# Patient Record
Sex: FEMALE | Race: WHITE | NOT HISPANIC OR LATINO | Employment: UNEMPLOYED | URBAN - METROPOLITAN AREA
[De-identification: names, ages, dates, MRNs, and addresses within clinical notes are randomized per-mention and may not be internally consistent; named-entity substitution may affect disease eponyms.]

---

## 2018-05-08 ENCOUNTER — HISTORICAL (OUTPATIENT)
Dept: ADMINISTRATIVE | Facility: HOSPITAL | Age: 43
End: 2018-05-08

## 2019-12-18 ENCOUNTER — HISTORICAL (OUTPATIENT)
Dept: ADMINISTRATIVE | Facility: HOSPITAL | Age: 44
End: 2019-12-18

## 2024-04-03 ENCOUNTER — OFFICE VISIT (OUTPATIENT)
Dept: OBSTETRICS AND GYNECOLOGY | Facility: CLINIC | Age: 49
End: 2024-04-03
Payer: MEDICAID

## 2024-04-03 VITALS
RESPIRATION RATE: 17 BRPM | SYSTOLIC BLOOD PRESSURE: 128 MMHG | BODY MASS INDEX: 30.76 KG/M2 | HEART RATE: 101 BPM | HEIGHT: 67 IN | DIASTOLIC BLOOD PRESSURE: 74 MMHG | WEIGHT: 196 LBS | OXYGEN SATURATION: 98 %

## 2024-04-03 DIAGNOSIS — Z01.419 ROUTINE GYNECOLOGICAL EXAMINATION: Primary | ICD-10-CM

## 2024-04-03 DIAGNOSIS — Z11.3 ENCOUNTER FOR SCREENING FOR INFECTIONS WITH PREDOMINANTLY SEXUAL MODE OF TRANSMISSION: ICD-10-CM

## 2024-04-03 DIAGNOSIS — R68.82 DECREASED LIBIDO: ICD-10-CM

## 2024-04-03 DIAGNOSIS — R53.83 OTHER FATIGUE: ICD-10-CM

## 2024-04-03 DIAGNOSIS — Z12.31 SCREENING MAMMOGRAM FOR BREAST CANCER: ICD-10-CM

## 2024-04-03 DIAGNOSIS — N92.6 IRREGULAR MENSTRUAL CYCLE: ICD-10-CM

## 2024-04-03 LAB
ERYTHROCYTE [DISTWIDTH] IN BLOOD BY AUTOMATED COUNT: 14.1 % (ref 11–14.5)
HCT VFR BLD AUTO: 31 % (ref 36–48)
HGB BLD-MCNC: 10.2 G/DL (ref 11.8–16)
MCH RBC QN AUTO: 26.5 PG (ref 27–34)
MCHC RBC AUTO-ENTMCNC: 32.9 G/DL (ref 31–37)
MCV RBC AUTO: 80.5 FL (ref 79–99)
NRBC BLD AUTO-RTO: 0 %
PLATELET # BLD AUTO: 524 X10(3)/MCL (ref 140–371)
PMV BLD AUTO: 8.4 FL (ref 9.4–12.4)
RBC # BLD AUTO: 3.85 X10(6)/MCL (ref 4–5.1)
WBC # SPEC AUTO: 8.18 X10(3)/MCL (ref 4–11.5)

## 2024-04-03 PROCEDURE — 3078F DIAST BP <80 MM HG: CPT | Mod: CPTII,,, | Performed by: NURSE PRACTITIONER

## 2024-04-03 PROCEDURE — 85027 COMPLETE CBC AUTOMATED: CPT | Performed by: NURSE PRACTITIONER

## 2024-04-03 PROCEDURE — 82166 ASSAY ANTI-MULLERIAN HORM: CPT | Performed by: NURSE PRACTITIONER

## 2024-04-03 PROCEDURE — 3008F BODY MASS INDEX DOCD: CPT | Mod: CPTII,,, | Performed by: NURSE PRACTITIONER

## 2024-04-03 PROCEDURE — 3074F SYST BP LT 130 MM HG: CPT | Mod: CPTII,,, | Performed by: NURSE PRACTITIONER

## 2024-04-03 PROCEDURE — 87591 N.GONORRHOEAE DNA AMP PROB: CPT | Performed by: NURSE PRACTITIONER

## 2024-04-03 PROCEDURE — 82670 ASSAY OF TOTAL ESTRADIOL: CPT | Performed by: NURSE PRACTITIONER

## 2024-04-03 PROCEDURE — 99386 PREV VISIT NEW AGE 40-64: CPT | Mod: ,,, | Performed by: NURSE PRACTITIONER

## 2024-04-03 PROCEDURE — 4010F ACE/ARB THERAPY RXD/TAKEN: CPT | Mod: CPTII,,, | Performed by: NURSE PRACTITIONER

## 2024-04-03 PROCEDURE — 83001 ASSAY OF GONADOTROPIN (FSH): CPT | Performed by: NURSE PRACTITIONER

## 2024-04-03 PROCEDURE — 87661 TRICHOMONAS VAGINALIS AMPLIF: CPT | Performed by: NURSE PRACTITIONER

## 2024-04-03 PROCEDURE — 1160F RVW MEDS BY RX/DR IN RCRD: CPT | Mod: CPTII,,, | Performed by: NURSE PRACTITIONER

## 2024-04-03 PROCEDURE — 1159F MED LIST DOCD IN RCRD: CPT | Mod: CPTII,,, | Performed by: NURSE PRACTITIONER

## 2024-04-03 PROCEDURE — 87491 CHLMYD TRACH DNA AMP PROBE: CPT | Performed by: NURSE PRACTITIONER

## 2024-04-03 RX ORDER — ALPRAZOLAM 0.25 MG/1
0.25 TABLET ORAL 2 TIMES DAILY PRN
COMMUNITY
Start: 2024-01-03

## 2024-04-03 RX ORDER — CLOBETASOL PROPIONATE 0.5 MG/G
1 CREAM TOPICAL 2 TIMES DAILY
COMMUNITY
Start: 2024-02-16

## 2024-04-03 RX ORDER — LEVOTHYROXINE, LIOTHYRONINE 38; 9 UG/1; UG/1
60 TABLET ORAL
COMMUNITY
Start: 2024-03-14

## 2024-04-03 RX ORDER — ONDANSETRON 8 MG/1
8 TABLET, ORALLY DISINTEGRATING ORAL EVERY 6 HOURS PRN
COMMUNITY
Start: 2023-12-16

## 2024-04-03 RX ORDER — LISINOPRIL 10 MG/1
10 TABLET ORAL DAILY
COMMUNITY
Start: 2024-04-01

## 2024-04-03 NOTE — PROGRESS NOTES
Patient ID: [id number]   Chief Complaint: Annual exam  Chief Complaint   Patient presents with    Well Woman     C/o irregular cycles, fatigue, insomnia. Would like hormones checked. Low sex drive .      HPI:   Flaquita Bustamante is a 49 y.o. year old  here for her Annual Exam. Patient's last menstrual period was 2024 (approximate). She is doing well. Denies any health changes.   Well Woman (C/o irregular cycles, fatigue, insomnia. Would like hormones checked. Low sex drive . )    Subjective:     Past Medical History:   Diagnosis Date    Anxiety disorder, unspecified     HTN (hypertension)     Hypothyroidism, unspecified      Past Surgical History:   Procedure Laterality Date    BILATERAL TUBAL LIGATION       SECTION      DILATION AND CURETTAGE OF UTERUS      hand sx       Social History     Tobacco Use    Smoking status: Every Day     Current packs/day: 1.00     Average packs/day: 1 pack/day for 24.3 years (24.3 ttl pk-yrs)     Types: Cigarettes     Start date:      Passive exposure: Current    Smokeless tobacco: Never   Substance Use Topics    Alcohol use: Yes     Comment: social    Drug use: Never     Family History   Problem Relation Age of Onset    Hypertension Father     Breast cancer Mother     Hypertension Mother     Diabetes Mother     Cancer Mother     Hyperlipidemia Mother     Breast cancer Other      OB History    Para Term  AB Living   7 5 5   2 4   SAB IAB Ectopic Multiple Live Births   2       5      # Outcome Date GA Lbr Vinh/2nd Weight Sex Delivery Anes PTL Lv   7 Term 07/10/09 37w0d   M CS-Unspec   DEC   6 Term 07 38w0d   F Vag-Spont EPI  [facility]   5 Term 05 38w0d   F Vag-Spont EPI  [facility]   4 2003 16w0d       FD   3 Term 02 38w0d   M Vag-Spont EPI  [facility]   2 2000 11w0d       FD   1 Term 98 38w0d   M Vag-Spont EPI  [facility]       Current Outpatient Medications:     ALPRAZolam (XANAX) 0.25 MG tablet, Take 0.25 mg by mouth 2 (two) times daily  "as needed for Anxiety., Disp: , Rfl:     clobetasoL (TEMOVATE) 0.05 % cream, Apply 1 application  topically 2 (two) times daily., Disp: , Rfl:     lisinopriL 10 MG tablet, Take 10 mg by mouth once daily., Disp: , Rfl:     NP THYROID 60 mg Tab, Take 60 mg by mouth before breakfast., Disp: , Rfl:     ondansetron (ZOFRAN-ODT) 8 MG TbDL, Take 8 mg by mouth every 6 (six) hours as needed., Disp: , Rfl:   MENARCHEAL:  Cycle Length: 7 days   Flow: heavy  Dysmenorrhea: Yes  If yes: [place]eraye  Intermenstrual Bleeding: No  PAP:  Last PAP: states" over 5 yrs ago"   History of Abnormal PAP Smear: NO  Treated: n/a  HPV Vaccine: NO  INTERCOURSE:  Dyspareunia: No  Postcoital Bleeding: No  History of STI: No   If yes, then: No   Current Birth Control Method: tubal ligation  Sexually Active: yes  BREAST HISTORY:   Last Mammogram: 3 yrs ago  History of Abnormal Mammogram: NO        Review of Systems 12 point review of systems conducted, negative except as stated in the history of present illness.   See HPI for details.  Objective:   Visit Vitals  /74 (BP Location: Left arm)   Pulse 101   Resp 17   Ht 5' 7" (1.702 m)   Wt 88.9 kg (196 lb)   LMP 03/18/2024 (Approximate)   SpO2 98%   BMI 30.70 kg/m²     No results found for this or any previous visit (from the past 24 hour(s)).  Physical Exam:  Chaperone present for exam.  Physical Exam  Constitutional:  General Appearance : alert, in no acute distress, normal, well nourished.  Cardiovascular:   Regular rate and rhythm.  Respiratory:  Respiratory Effort: normal.  Breast:  Right: Inspection/palpation: no discharge, no masses present, no nipple retraction, no skin changes, no skin dimpling, no tenderness, no lymphadenopathy, no axillary mass, no axillary tenderness.  Left: Inspection/palpation: no discharge, no masses present, no nipple retraction, no skin changes, no skin dimpling, no tenderness, no lymphadenopathy, no axillary mass, no axillary " tenderness.  Gastrointestinal:  Abdomen: no masses. no tender, nondistended.  Hernias: no hernias present.  Genitourinary:  External Genitalia: normal, no lesions.  Vagina: normal appearance, no abnormal discharge, no lesions.  Bladder: no mass, nontender.  Urethra: no erythema or lesions present.  Cervix: no lesions, non tender. Pap Done  Uterus: nontender, normal contour, normal mobility, normal size.   Adnexa: no masses, no tenderness.  Anus and Perineum: visually normal.     No results found for this or any previous visit (from the past 24 hour(s)).  Assessment/Plan:   Assessment:   Routine gynecological examination  -     Liquid-Based Pap Smear, Screening Screening; Future; Expected date: 04/03/2024    Screening mammogram for breast cancer  -     Mammo Digital Screening Bilat; Future; Expected date: 04/03/2024    Encounter for screening for infections with predominantly sexual mode of transmission  -     Liquid-Based Pap Smear, Screening Screening; Future; Expected date: 04/03/2024    Irregular menstrual cycle  -     Follicle Stimulating Hormone  -     Estradiol  -     Antimullerian Hormone (AMH)  -     CBC Without Differential    Other fatigue  -     Follicle Stimulating Hormone  -     Estradiol  -     Antimullerian Hormone (AMH)  -     CBC Without Differential    Decreased libido  -     Follicle Stimulating Hormone  -     Estradiol  -     Antimullerian Hormone (AMH)  -     CBC Without Differential    Compound testosterone cream sent to [facility]  Follow up in about 1 year (around 4/3/2025) for ANNUAL.   In addition to their scheduled follow-up, the patient has also been instructed to follow up on as needed basis.   All questions were answered and the patient voiced understanding of the above issues.

## 2024-04-04 LAB
ESTRADIOL SERPL HS-MCNC: 81 PG/ML
FSH SERPL-ACNC: 7.03 MIU/ML
MIS SERPL IA-MCNC: 0.13 NG/ML

## 2024-04-08 LAB
CHLAMYDIA TRACHOMATIS: NEGATIVE
NEISSERIA GONORRHOEAE: NEGATIVE
PSYCHE PATHOLOGY RESULT: NORMAL
TRICHOMONAS VAGINALIS: NEGATIVE

## 2024-04-09 ENCOUNTER — TELEPHONE (OUTPATIENT)
Dept: OBSTETRICS AND GYNECOLOGY | Facility: CLINIC | Age: 49
End: 2024-04-09
Payer: MEDICAID

## 2024-04-09 NOTE — TELEPHONE ENCOUNTER
Returned patient callwilver confirmed informed need 2 week appt. To go over results. Appointment made. Verbalization understanding ----- Message from Isaura Mcmahon MA sent at 2024  2:53 PM CDT -----  Pt called received results from blood work (hormone level and iron) does not understand results and would like to speak with nurse.       Call back #  [phone]

## 2024-04-22 ENCOUNTER — OFFICE VISIT (OUTPATIENT)
Dept: OBSTETRICS AND GYNECOLOGY | Facility: CLINIC | Age: 49
End: 2024-04-22
Payer: MEDICAID

## 2024-04-22 VITALS
WEIGHT: 199 LBS | SYSTOLIC BLOOD PRESSURE: 138 MMHG | HEART RATE: 101 BPM | BODY MASS INDEX: 31.23 KG/M2 | DIASTOLIC BLOOD PRESSURE: 82 MMHG | HEIGHT: 67 IN | OXYGEN SATURATION: 100 % | RESPIRATION RATE: 18 BRPM

## 2024-04-22 DIAGNOSIS — N92.6 IRREGULAR MENSTRUAL CYCLE: Primary | ICD-10-CM

## 2024-04-22 DIAGNOSIS — R68.82 DECREASED LIBIDO: ICD-10-CM

## 2024-04-22 DIAGNOSIS — R53.83 OTHER FATIGUE: ICD-10-CM

## 2024-04-22 DIAGNOSIS — D64.9 ANEMIA, UNSPECIFIED TYPE: ICD-10-CM

## 2024-04-22 PROCEDURE — 1160F RVW MEDS BY RX/DR IN RCRD: CPT | Mod: CPTII,,, | Performed by: NURSE PRACTITIONER

## 2024-04-22 PROCEDURE — 99213 OFFICE O/P EST LOW 20 MIN: CPT | Mod: ,,, | Performed by: NURSE PRACTITIONER

## 2024-04-22 PROCEDURE — 3008F BODY MASS INDEX DOCD: CPT | Mod: CPTII,,, | Performed by: NURSE PRACTITIONER

## 2024-04-22 PROCEDURE — 1159F MED LIST DOCD IN RCRD: CPT | Mod: CPTII,,, | Performed by: NURSE PRACTITIONER

## 2024-04-22 PROCEDURE — 4010F ACE/ARB THERAPY RXD/TAKEN: CPT | Mod: CPTII,,, | Performed by: NURSE PRACTITIONER

## 2024-04-22 PROCEDURE — 3079F DIAST BP 80-89 MM HG: CPT | Mod: CPTII,,, | Performed by: NURSE PRACTITIONER

## 2024-04-22 PROCEDURE — 3075F SYST BP GE 130 - 139MM HG: CPT | Mod: CPTII,,, | Performed by: NURSE PRACTITIONER

## 2024-04-22 RX ORDER — DEXTROAMPHETAMINE SACCHARATE, AMPHETAMINE ASPARTATE MONOHYDRATE, DEXTROAMPHETAMINE SULFATE AND AMPHETAMINE SULFATE 7.5; 7.5; 7.5; 7.5 MG/1; MG/1; MG/1; MG/1
30 CAPSULE, EXTENDED RELEASE ORAL 2 TIMES DAILY
COMMUNITY

## 2024-04-22 RX ORDER — OMEPRAZOLE 40 MG/1
40 CAPSULE, DELAYED RELEASE ORAL EVERY MORNING
COMMUNITY

## 2024-04-22 RX ORDER — IRON,CARB/VIT C/VIT B12/FOLIC 100-250-1
1 TABLET ORAL DAILY
Qty: 30 TABLET | Refills: 0 | Status: SHIPPED | OUTPATIENT
Start: 2024-04-22

## 2024-04-22 NOTE — PROGRESS NOTES
Patient ID: [id number]   Chief Complaint: Results (Presents to clinic for lab results . Cont with fatigue , no sex drive . )    HPI:   Flaquita Bustamante is a 49 y.o.  here today for Results (Presents to clinic for lab results . Cont with fatigue , no sex drive . )   Patient's last menstrual period was 2024 (exact date).  Past Medical History:  has a past medical history of Anxiety disorder, unspecified, HTN (hypertension), and Hypothyroidism, unspecified.  Surgical History:  has a past surgical history that includes Bilateral tubal ligation;  section; Dilation and curettage of uterus; and hand sx.  Family History: family history includes Breast cancer in her mother and another family member; Cancer in her mother; Diabetes in her mother; Hyperlipidemia in her mother; Hypertension in her father and mother.  Social History:  reports that she has been smoking cigarettes. She started smoking about 24 years ago. She has a 24.3 pack-year smoking history. She has been exposed to tobacco smoke. She has never used smokeless tobacco. She reports current alcohol use. She reports that she does not use drugs.  Current Outpatient Medications   Medication Sig Dispense Refill    ALPRAZolam (XANAX) 0.25 MG tablet Take 0.25 mg by mouth 2 (two) times daily as needed for Anxiety.      clobetasoL (TEMOVATE) 0.05 % cream Apply 1 application  topically 2 (two) times daily.      dextroamphetamine-amphetamine (ADDERALL XR) 30 MG 24 hr capsule Take 30 mg by mouth 2 (two) times a day.      lisinopriL 10 MG tablet Take 10 mg by mouth once daily.      NP THYROID 60 mg Tab Take 60 mg by mouth before breakfast.      omeprazole (PRILOSEC) 40 MG capsule Take 40 mg by mouth every morning.      ondansetron (ZOFRAN-ODT) 8 MG TbDL Take 8 mg by mouth every 6 (six) hours as needed.      iron-vit c-b12-folic acid (ICAR-C PLUS) Tab Take 1 tablet by mouth Daily. 30 tablet 0     No current facility-administered medications for this  "visit.     Patient is allergic to latex and gloves, latex with aloe vera.  MENARCHEAL:  Cycle Length: 9 days   Flow: heavy for first few days  Dysmenorrhea: No  If yes: Mild  Intermenstrual Bleeding: No  PAP:  Last PAP: 04/03/2024    History of Abnormal PAP Smear: NO  Treated: n/a  HPV Vaccine: NO  INTERCOURSE:  Dyspareunia: No  Postcoital Bleeding: No  History of STI: No   If yes, then: No   Current Birth Control Method: tubal ligation  Sexually Active: yes  BREAST HISTORY:   Last Mammogram: several yrs ago  History of Abnormal Mammogram: NO        No results found for this or any previous visit (from the past 24 hour(s)).    Subjective:   Review of Systems  12 point review of systems conducted, negative except as stated in the history of present illness. See HPI for details.  Objective:     Visit Vitals  /82 (BP Location: Right arm)   Pulse 101   Resp 18   Ht 5' 7" (1.702 m)   Wt 90.3 kg (199 lb)   LMP 04/05/2024 (Exact Date)   SpO2 100%   BMI 31.17 kg/m²     No results found for this or any previous visit (from the past 24 hour(s)).  Physical Exam  Constitutional:  General Appearance : alert, in no acute distress, normal, well nourished.  Neck/Thyroid:  Inspection/Palpation: normal. Thyroid: normal size and shape.  Respiratory:  Respiratory Effort: normal.  Gastrointestinal:  Abdomen: no masses. no tender, nondistended.  Liver and spleen: normal  Hernias: no hernias present, no inguinal adenopathy.        ICD-10-CM ICD-9-CM   1. Irregular menstrual cycle  N92.6 626.4   2. Other fatigue  R53.83 780.79   3. Decreased libido  R68.82 799.81   4. Anemia, unspecified type  D64.9 285.9     Plan   Irregular menstrual cycle    Other fatigue    Decreased libido    Anemia, unspecified type  -     iron-vit c-b12-folic acid (ICAR-C PLUS) Tab; Take 1 tablet by mouth Daily.  Dispense: 30 tablet; Refill: 0    Pt to fu with pcp anemia    No follow-ups on file. In addition to their scheduled follow up, the patient has also " been instructed to follow up on as needed basis.     GENTRY Savage

## 2025-07-01 ENCOUNTER — OFFICE VISIT (OUTPATIENT)
Dept: OBSTETRICS AND GYNECOLOGY | Facility: CLINIC | Age: 50
End: 2025-07-01
Payer: MEDICAID

## 2025-07-01 VITALS
BODY MASS INDEX: 30.85 KG/M2 | HEART RATE: 86 BPM | SYSTOLIC BLOOD PRESSURE: 160 MMHG | WEIGHT: 197 LBS | DIASTOLIC BLOOD PRESSURE: 90 MMHG

## 2025-07-01 DIAGNOSIS — Z01.419 WELL WOMAN EXAM: ICD-10-CM

## 2025-07-01 DIAGNOSIS — Z12.31 SCREENING MAMMOGRAM FOR BREAST CANCER: Primary | ICD-10-CM

## 2025-07-01 DIAGNOSIS — Z01.419 ROUTINE GYNECOLOGICAL EXAMINATION: ICD-10-CM

## 2025-07-01 DIAGNOSIS — Z12.4 SCREENING FOR CERVICAL CANCER: ICD-10-CM

## 2025-07-01 NOTE — PROGRESS NOTES
Patient ID: [id number]   Chief Complaint:   Chief Complaint   Patient presents with    Annual Exam     NO C/O'S.     HPI:   Flaquita Bustamante is a 50 y.o. year old  here for her Annual Exam.   She is doing well. Denies any health changes. Denies complaints today.  Patient's last menstrual period was 06/15/2025.     Subjective:     Past Medical History:   Diagnosis Date    Anxiety disorder, unspecified     HTN (hypertension)     Hypothyroidism, unspecified      Past Surgical History:   Procedure Laterality Date    BILATERAL TUBAL LIGATION       SECTION      DILATION AND CURETTAGE OF UTERUS      hand sx       Social History[1]  Family History   Problem Relation Name Age of Onset    Hypertension Father      Breast cancer Mother      Hypertension Mother      Diabetes Mother      Cancer Mother      Hyperlipidemia Mother      Breast cancer Other aunt      OB History    Para Term  AB Living   7 5 5  2 4   SAB IAB Ectopic Multiple Live Births   2    5      # Outcome Date GA Lbr Vinh/2nd Weight Sex Type Anes PTL Lv   7 Term 07/10/09 37w0d   M CS-Unspec   DEC   6 Term 07 38w0d   F Vag-Spont EPI  [facility]   5 Term 05 38w0d   F Vag-Spont EPI  [facility]   4 2003 16w0d       FD   3 Term 02 38w0d   M Vag-Spont EPI  [facility]   2 2000 11w0d       FD   1 Term 98 38w0d   M Vag-Spont EPI  [facility]     Current Medications[2]  MENARCHEAL:  Cycle Length: 5 days   Flow: normal  Dysmenorrhea: No  Intermenstrual Bleeding: No  PAP:  Last PAP: 2024    History of Abnormal PAP Smear: NO  HPV Vaccine: NO  INTERCOURSE:  Dyspareunia: No  Postcoital Bleeding: No  History of STI: No   If yes, then: No   Current Birth Control Method: tubal ligation  Sexually Active: yes  BREAST HISTORY:   Last MammogrAM  LAST YEAR  History of Abnormal Mammogram: NO  MENOPAUSE:  Post Menopausal Bleeding: No  Hormone Replacement Therapy: No    Review of Systems 12 point review of systems conducted, negative except as stated  in the history of present illness.     See HPI for details.  Objective:   Visit Vitals  BP (!) 160/90   Pulse 86   Wt 89.4 kg (197 lb)   LMP 06/15/2025   BMI 30.85 kg/m²   Physical Exam:    Constitutional:  General Appearance : alert, in no acute distress, normal, well nourished.  Cardiovascular:   Regular rate and rhythm.  Respiratory:  Respiratory Effort: normal.    Breast:  Right: Inspection/palpation: no discharge, no masses present, no nipple retraction, no skin changes, no skin dimpling, no tenderness, no lymphadenopathy, no axillary mass, no axillary tenderness.  Left: Inspection/palpation: no discharge, no masses present, no nipple retraction, no skin changes, no skin dimpling, no tenderness, no lymphadenopathy, no axillary mass, no axillary tenderness.    Gastrointestinal:  Abdomen: no masses. no tender, nondistended.    Genitourinary:  External Genitalia: normal, no lesions.  Vagina: normal appearance, no abnormal discharge, no lesions.  Bladder: no mass, nontender.  Urethra: no erythema or lesions present.  Cervix: no lesions, non tender. Pap Done PT DECLINED STD TESTING  Uterus: nontender, normal contour, normal mobility, normal size.   Adnexa: no masses, no tenderness.  Anus and Perineum: visually normal.     Chaperone present for interview and exam.  Assessment/Plan:   Assessment:   Screening mammogram for breast cancer  -     Mammo Digital Screening Bilat; Future; Expected date: 07/15/2025    Routine gynecological examination  -     Liquid-Based Pap Smear, Screening    Screening for cervical cancer  -     Liquid-Based Pap Smear, Screening    Well woman exam    Follow up in about 1 year (around 7/1/2026) for [facility].   In addition to their scheduled follow-up, the patient has also been instructed to follow up on as needed basis.   All questions were answered and the patient voiced understanding of the above issues.  Pt has hx HTN  but did not take her medication today. Reports she is going to pharmacy  today to  her RX       [1]   Social History  Tobacco Use    Smoking status: Every Day     Current packs/day: 1.00     Average packs/day: 1 pack/day for 25.5 years (25.5 ttl pk-yrs)     Types: Cigarettes     Start date: 2000     Passive exposure: Current    Smokeless tobacco: Never   Substance Use Topics    Alcohol use: Yes     Comment: social    Drug use: Never   [2]   Current Outpatient Medications:     ALPRAZolam (XANAX) 0.25 MG tablet, Take 0.25 mg by mouth 2 (two) times daily as needed for Anxiety., Disp: , Rfl:     clobetasoL (TEMOVATE) 0.05 % cream, Apply 1 application  topically 2 (two) times daily., Disp: , Rfl:     dextroamphetamine-amphetamine (ADDERALL XR) 30 MG 24 hr capsule, Take 30 mg by mouth 2 (two) times a day., Disp: , Rfl:     iron-vit c-b12-folic acid (ICAR-C PLUS) Tab, Take 1 tablet by mouth Daily., Disp: 30 tablet, Rfl: 0    lisinopriL 10 MG tablet, Take 10 mg by mouth once daily., Disp: , Rfl:     NP THYROID 60 mg Tab, Take 60 mg by mouth before breakfast., Disp: , Rfl:     omeprazole (PRILOSEC) 40 MG capsule, Take 40 mg by mouth every morning., Disp: , Rfl:     ondansetron (ZOFRAN-ODT) 8 MG TbDL, Take 8 mg by mouth every 6 (six) hours as needed., Disp: , Rfl:

## 2025-07-03 ENCOUNTER — RESULTS FOLLOW-UP (OUTPATIENT)
Dept: OBSTETRICS AND GYNECOLOGY | Facility: CLINIC | Age: 50
End: 2025-07-03

## 2025-07-03 LAB — PSYCHE PATHOLOGY RESULT: NORMAL
